# Patient Record
Sex: FEMALE | Race: ASIAN | NOT HISPANIC OR LATINO | Employment: FULL TIME | URBAN - METROPOLITAN AREA
[De-identification: names, ages, dates, MRNs, and addresses within clinical notes are randomized per-mention and may not be internally consistent; named-entity substitution may affect disease eponyms.]

---

## 2021-09-17 ENCOUNTER — TELEPHONE (OUTPATIENT)
Dept: ONCOLOGY | Facility: CLINIC | Age: 51
End: 2021-09-17

## 2021-09-17 ENCOUNTER — TELEPHONE (OUTPATIENT)
Dept: SOCIAL WORK | Facility: HOSPITAL | Age: 51
End: 2021-09-17

## 2021-09-17 NOTE — TELEPHONE ENCOUNTER
Caller: Katia Hatfield    Relationship to patient: SELF     Best call back number: [phone]    Patient is needing: TRANSPORTATION FOR 9- APPT.

## 2021-09-17 NOTE — TELEPHONE ENCOUNTER
SW called pt to provide assistance with transportation needs.  LVM requesting a call back.  SW available for ongoing support and resource needs.

## 2021-09-23 ENCOUNTER — TELEPHONE (OUTPATIENT)
Dept: ONCOLOGY | Facility: CLINIC | Age: 51
End: 2021-09-23

## 2021-09-23 NOTE — TELEPHONE ENCOUNTER
Caller: Katia Hatfield    Relationship to patient: Self    Best call back number: [phone]    Chief complaint: PT WILL NEED [facility]  FOR NEW PT APPT ON 9/30/21    Type of visit: NEW PT    Requested date: 9/30/21    If rescheduling, when is the original appointment: NA    Additional notes:PT OK WITH PHONE . JUST NOTIFYING PRACTICE.

## 2021-09-27 ENCOUNTER — TELEPHONE (OUTPATIENT)
Dept: ONCOLOGY | Facility: CLINIC | Age: 51
End: 2021-09-27

## 2021-09-27 NOTE — TELEPHONE ENCOUNTER
Caller: Katia Hatfield    Relationship to patient: Self    Best call back number: [phone]    Chief complaint:  PT NEEDS TO RESCHEDULE    Type of visit: NEW PT    Requested date: 10/07    If rescheduling, when is the original appointment: 09/30    Additional notes: PT NEEDS

## 2021-09-28 NOTE — TELEPHONE ENCOUNTER
Called patient back to inform her Dr. Gonzalez doesn't have anything on 10/7/21. She said she will make the appointment on 9/30/21.

## 2021-09-30 ENCOUNTER — CONSULT (OUTPATIENT)
Dept: ONCOLOGY | Facility: CLINIC | Age: 51
End: 2021-09-30

## 2021-09-30 ENCOUNTER — LAB (OUTPATIENT)
Dept: LAB | Facility: HOSPITAL | Age: 51
End: 2021-09-30

## 2021-09-30 VITALS
TEMPERATURE: 97.8 F | OXYGEN SATURATION: 97 % | DIASTOLIC BLOOD PRESSURE: 77 MMHG | HEART RATE: 83 BPM | WEIGHT: 135.5 LBS | RESPIRATION RATE: 16 BRPM | SYSTOLIC BLOOD PRESSURE: 118 MMHG | HEIGHT: 63 IN | BODY MASS INDEX: 24.01 KG/M2

## 2021-09-30 DIAGNOSIS — Z12.31 ENCOUNTER FOR SCREENING MAMMOGRAM FOR MALIGNANT NEOPLASM OF BREAST: Primary | ICD-10-CM

## 2021-09-30 LAB
ALBUMIN SERPL-MCNC: 4.4 G/DL (ref 3.5–5.2)
ALBUMIN/GLOB SERPL: 1.6 G/DL
ALP SERPL-CCNC: 47 U/L (ref 39–117)
ALT SERPL W P-5'-P-CCNC: 17 U/L (ref 1–33)
ANION GAP SERPL CALCULATED.3IONS-SCNC: 11 MMOL/L (ref 5–15)
AST SERPL-CCNC: 23 U/L (ref 1–32)
BILIRUB SERPL-MCNC: 0.2 MG/DL (ref 0–1.2)
BUN SERPL-MCNC: 20 MG/DL (ref 6–20)
BUN/CREAT SERPL: 24.7 (ref 7–25)
CALCIUM SPEC-SCNC: 9.6 MG/DL (ref 8.6–10.5)
CHLORIDE SERPL-SCNC: 103 MMOL/L (ref 98–107)
CO2 SERPL-SCNC: 27 MMOL/L (ref 22–29)
CREAT SERPL-MCNC: 0.81 MG/DL (ref 0.57–1)
ERYTHROCYTE [DISTWIDTH] IN BLOOD BY AUTOMATED COUNT: 13 % (ref 12.3–15.4)
GFR SERPL CREATININE-BSD FRML MDRD: 75 ML/MIN/1.73
GFR SERPL CREATININE-BSD FRML MDRD: 90 ML/MIN/1.73
GLOBULIN UR ELPH-MCNC: 2.8 GM/DL
GLUCOSE SERPL-MCNC: 89 MG/DL (ref 65–99)
HCT VFR BLD AUTO: 38.9 % (ref 34–46.6)
HGB BLD-MCNC: 12.6 G/DL (ref 12–15.9)
LYMPHOCYTES # BLD AUTO: 2 10*3/MM3 (ref 0.7–3.1)
LYMPHOCYTES NFR BLD AUTO: 34.1 % (ref 19.6–45.3)
MCH RBC QN AUTO: 28.2 PG (ref 26.6–33)
MCHC RBC AUTO-ENTMCNC: 32.3 G/DL (ref 31.5–35.7)
MCV RBC AUTO: 87.3 FL (ref 79–97)
MONOCYTES # BLD AUTO: 0.3 10*3/MM3 (ref 0.1–0.9)
MONOCYTES NFR BLD AUTO: 4.6 % (ref 5–12)
NEUTROPHILS NFR BLD AUTO: 3.6 10*3/MM3 (ref 1.7–7)
NEUTROPHILS NFR BLD AUTO: 61.3 % (ref 42.7–76)
PLATELET # BLD AUTO: 183 10*3/MM3 (ref 140–450)
PMV BLD AUTO: 8.6 FL (ref 6–12)
POTASSIUM SERPL-SCNC: 4.3 MMOL/L (ref 3.5–5.2)
PROT SERPL-MCNC: 7.2 G/DL (ref 6–8.5)
RBC # BLD AUTO: 4.46 10*6/MM3 (ref 3.77–5.28)
SODIUM SERPL-SCNC: 141 MMOL/L (ref 136–145)
WBC # BLD AUTO: 5.9 10*3/MM3 (ref 3.4–10.8)

## 2021-09-30 PROCEDURE — 36415 COLL VENOUS BLD VENIPUNCTURE: CPT | Performed by: INTERNAL MEDICINE

## 2021-09-30 PROCEDURE — 80053 COMPREHEN METABOLIC PANEL: CPT | Performed by: INTERNAL MEDICINE

## 2021-09-30 PROCEDURE — 99205 OFFICE O/P NEW HI 60 MIN: CPT | Performed by: INTERNAL MEDICINE

## 2021-09-30 PROCEDURE — 85025 COMPLETE CBC W/AUTO DIFF WBC: CPT | Performed by: INTERNAL MEDICINE

## 2021-09-30 RX ORDER — CHLORAL HYDRATE 500 MG
CAPSULE ORAL
COMMUNITY

## 2021-09-30 RX ORDER — LOSARTAN POTASSIUM 50 MG/1
50 TABLET ORAL DAILY
COMMUNITY
End: 2022-04-20 | Stop reason: SDUPTHER

## 2021-09-30 RX ORDER — TAMOXIFEN CITRATE 20 MG/1
TABLET ORAL DAILY
COMMUNITY
End: 2021-09-30 | Stop reason: SDUPTHER

## 2021-09-30 RX ORDER — PANTOPRAZOLE SODIUM 20 MG/1
20 TABLET, DELAYED RELEASE ORAL DAILY
COMMUNITY
End: 2022-03-31

## 2021-09-30 RX ORDER — TAMOXIFEN CITRATE 20 MG/1
20 TABLET ORAL DAILY
Qty: 90 TABLET | Refills: 3 | Status: SHIPPED | OUTPATIENT
Start: 2021-09-30 | End: 2021-12-09 | Stop reason: SDUPTHER

## 2021-09-30 RX ORDER — TRAZODONE HYDROCHLORIDE 50 MG/1
50 TABLET ORAL NIGHTLY
COMMUNITY
End: 2022-08-29

## 2021-09-30 NOTE — PROGRESS NOTES
"  Subjective     PROBLEM LIST:  1. [id number] ER+ CO+ Her2+ (neg by FISH)  A) right mastectomy 7/2017 showed a 2.6 cm high grade IDC. 1/2 SLN involved.  MRI 7/2017 showed enhancing mass in the right hepatic lobe 1.8 cm.  Liver biopsy showed liver cirrhosis without evidence of malignancy.   Follow up imaging of liver stable through 12/2020.  B) adjuvant treatment with TCH x 6 cycles, followed by herceptin.  Tamoxifen started 1/2018.  2. hypertension    CHIEF COMPLAINT: breast cancer      HISTORY OF PRESENT ILLNESS:  The patient is a 50 y.o. female, referred for a history of triple positive breast cancer, currently on tamoxifen.  She has recently moved here from [place] and is here to establish care.    She says she does ok with tamoxifen - she does have some difficulty sleeping and takes trazodone for this.  She has hot flashes but she does not feel like she needs to take anything for this.    She has had about 2 months of upset stomach and occasional nausea in the morning.  It sometimes bothers her at night.  She thinks this is gotten better recently.    She previously had discussed 10 years of tamoxifen with her oncologist.    dexa scan 12/2020 - normal    REVIEW OF SYSTEMS:  A 14 point review of systems was performed and is negative except as noted above.    Past Medical History:   Diagnosis Date   • Gall stones    • Hypertension              Objective     /77   Pulse 83   Temp 97.8 °F (36.6 °C) (Temporal)   Resp 16   Ht 160 cm (62.99\")   Wt 61.5 kg (135 lb 8 oz)   SpO2 97%   BMI 24.01 kg/m²   Performance Status:  ECOG score: 0           General: well appearing female in no acute distress  Neuro: alert and oriented  HEENT: sclerae anicteric, oropharynx clear  Lymphatics: no cervical, supraclavicular, or axillary adenopathy  Cardiovascular: regular rate and rhythm, no murmurs  Lungs: clear to auscultation bilaterally  Abdomen: soft, nontender, nondistended.  No palpable organomegaly  Extremities: no " lower extremity edema  Skin: no rashes, lesions, bruising, or petechiae  Psych: mood and affect appropriate    No results found for: WBC, HGB, HCT, MCV, PLT  No results found for: GLUCOSE, BUN, CREATININE, EGFRIFNONA, EGFRIFAFRI, BCR, K, CO2, CALCIUM, PROTENTOTREF, ALBUMIN, LABIL2, BILIRUBIN, AST, ALT    No image results found.            Assessment/Plan     Katia Hatfield is a 50 y.o. female 2ith a history of T2N1M0 ER+ Her2+ IDC of the right breast, here to establish care, on tamoxifen.    Plan to continue tamoxifen for a minimum of 5 years, which will be 1/2022.  Discussed that there is an option for extended endocrine therapy.  She feels comfortable with continuing tamoxifen for 10 years.    I will order a left breast screening mammogram to be done in December.    She would be due for repeat bone density scan in December 2022.    She had about 3 years of follow-up imaging of her liver after her liver biopsy.  There are no concerning lesions and I do not think she needs further follow-up imaging at this time.    Follow-up in 6 months.           A total greater than 60 mins minutes was spent in face to face patient time, examination, counseling, charting, reviewing test results, and reviewing outside records.    Winter Gonzalez MD    9/30/2021

## 2021-11-04 ENCOUNTER — TELEPHONE (OUTPATIENT)
Dept: ONCOLOGY | Facility: CLINIC | Age: 51
End: 2021-11-04

## 2021-11-04 NOTE — TELEPHONE ENCOUNTER
Caller: ISABELL    Relationship: DR [facility]    Best call back number: [phone]    What is the best time to reach you: ANYTIME    Who are you requesting to speak with (clinical staff, provider,  specific staff member): DR SALAZAR OR NURSE    What was the call regarding: PT WAS SUPPOSED TO HAVE HAD A TRANSVAGINAL ULTRASOUND IN July WHEN LIVING IN [place]. SHE RECENTLY MOVED TO [place] AND NEEDS TO GET THIS SCHEDULED. ISABELL WOULD LIKE TO KNOW IF THEY SHOULD SCHEDULE THIS OR IF DR SALAZAR WILL BE SCHEDULING IT.     PLS CALL ISABELL BACK TO ADVISE.

## 2021-11-08 ENCOUNTER — TRANSCRIBE ORDERS (OUTPATIENT)
Dept: ADMINISTRATIVE | Facility: HOSPITAL | Age: 51
End: 2021-11-08

## 2021-11-08 DIAGNOSIS — Z79.810 LONG TERM CURR USE OF SELECTIVE ESTROGEN RECEPTOR MODULATORS (SERMS): Primary | ICD-10-CM

## 2021-11-18 ENCOUNTER — HOSPITAL ENCOUNTER (OUTPATIENT)
Dept: ULTRASOUND IMAGING | Facility: HOSPITAL | Age: 51
Discharge: HOME | End: 2021-11-18
Admitting: FAMILY MEDICINE

## 2021-11-18 DIAGNOSIS — Z79.810 LONG TERM CURR USE OF SELECTIVE ESTROGEN RECEPTOR MODULATORS (SERMS): ICD-10-CM

## 2021-11-18 PROCEDURE — 76830 TRANSVAGINAL US NON-OB: CPT

## 2021-12-09 DIAGNOSIS — C50.412 MALIGNANT NEOPLASM OF UPPER-OUTER QUADRANT OF LEFT FEMALE BREAST, UNSPECIFIED ESTROGEN RECEPTOR STATUS (HCC): Primary | ICD-10-CM

## 2021-12-09 RX ORDER — TAMOXIFEN CITRATE 20 MG/1
20 TABLET ORAL DAILY
Qty: 90 TABLET | Refills: 3 | Status: SHIPPED | OUTPATIENT
Start: 2021-12-09

## 2021-12-09 NOTE — TELEPHONE ENCOUNTER
Caller: Katia Hatfield    Relationship: Self    Best call back number: [phone]    Requested Prescriptions:   Requested Prescriptions     Pending Prescriptions Disp Refills   • tamoxifen (NOLVADEX) 20 MG chemo tablet 90 tablet 3     Sig: Take 1 tablet by mouth Daily.        Pharmacy where request should be sent: [id number] - [place], [place] - [address] - [phone]  - [phone] FX     Additional details provided by patient: PATIENT HAS ONE TABLET LEFT. PATIENT WOULD LIKE A 3 DAY SUPPLY    Does the patient have less than a 3 day supply:  [x] Yes  [] No

## 2021-12-13 ENCOUNTER — APPOINTMENT (OUTPATIENT)
Dept: OTHER | Facility: HOSPITAL | Age: 51
End: 2021-12-13

## 2021-12-13 ENCOUNTER — HOSPITAL ENCOUNTER (OUTPATIENT)
Dept: MAMMOGRAPHY | Facility: HOSPITAL | Age: 51
Discharge: HOME | End: 2021-12-13
Admitting: INTERNAL MEDICINE

## 2021-12-13 DIAGNOSIS — Z12.31 ENCOUNTER FOR SCREENING MAMMOGRAM FOR MALIGNANT NEOPLASM OF BREAST: ICD-10-CM

## 2021-12-13 PROCEDURE — 77063 BREAST TOMOSYNTHESIS BI: CPT | Performed by: RADIOLOGY

## 2021-12-13 PROCEDURE — 77067 SCR MAMMO BI INCL CAD: CPT

## 2021-12-13 PROCEDURE — 77067 SCR MAMMO BI INCL CAD: CPT | Performed by: RADIOLOGY

## 2021-12-13 PROCEDURE — 77063 BREAST TOMOSYNTHESIS BI: CPT

## 2022-03-24 DIAGNOSIS — C50.412 MALIGNANT NEOPLASM OF UPPER-OUTER QUADRANT OF LEFT FEMALE BREAST, UNSPECIFIED ESTROGEN RECEPTOR STATUS: ICD-10-CM

## 2022-03-24 RX ORDER — TAMOXIFEN CITRATE 20 MG/1
20 TABLET ORAL DAILY
Qty: 90 TABLET | Refills: 3 | OUTPATIENT
Start: 2022-03-24

## 2022-03-24 NOTE — TELEPHONE ENCOUNTER
Caller: Katia Hatfield    Relationship: Self    Best call back number: [phone]    Requested Prescriptions:   Requested Prescriptions     Pending Prescriptions Disp Refills   • tamoxifen (NOLVADEX) 20 MG chemo tablet 90 tablet 3     Sig: Take 1 tablet by mouth Daily.        Pharmacy where request should be sent: [id number] - [place], [place] - [address] - [phone]  - [phone] FX     Additional details provided by patieNT: PATIENT HAS 2DAY SUPPLY    Does the patient have less than a 3 day supply:  [x] Yes  [] No    Kameron Pérez Rep   03/24/22 09:59 EDT

## 2022-03-31 ENCOUNTER — OFFICE VISIT (OUTPATIENT)
Dept: ONCOLOGY | Facility: CLINIC | Age: 52
End: 2022-03-31

## 2022-03-31 ENCOUNTER — LAB (OUTPATIENT)
Dept: LAB | Facility: HOSPITAL | Age: 52
End: 2022-03-31

## 2022-03-31 VITALS
HEART RATE: 83 BPM | OXYGEN SATURATION: 97 % | SYSTOLIC BLOOD PRESSURE: 140 MMHG | WEIGHT: 134 LBS | DIASTOLIC BLOOD PRESSURE: 75 MMHG | HEIGHT: 63 IN | TEMPERATURE: 97.1 F | RESPIRATION RATE: 16 BRPM | BODY MASS INDEX: 23.74 KG/M2

## 2022-03-31 DIAGNOSIS — R53.82 CHRONIC FATIGUE: Primary | ICD-10-CM

## 2022-03-31 DIAGNOSIS — F41.9 ANXIETY: ICD-10-CM

## 2022-03-31 DIAGNOSIS — R07.9 CHEST PAIN, UNSPECIFIED TYPE: ICD-10-CM

## 2022-03-31 DIAGNOSIS — K21.9 GASTROESOPHAGEAL REFLUX DISEASE WITHOUT ESOPHAGITIS: ICD-10-CM

## 2022-03-31 DIAGNOSIS — C50.412 MALIGNANT NEOPLASM OF UPPER-OUTER QUADRANT OF LEFT FEMALE BREAST, UNSPECIFIED ESTROGEN RECEPTOR STATUS: ICD-10-CM

## 2022-03-31 DIAGNOSIS — R06.00 DYSPNEA, UNSPECIFIED TYPE: ICD-10-CM

## 2022-03-31 DIAGNOSIS — Z12.31 ENCOUNTER FOR SCREENING MAMMOGRAM FOR MALIGNANT NEOPLASM OF BREAST: ICD-10-CM

## 2022-03-31 LAB
ALBUMIN SERPL-MCNC: 4.3 G/DL (ref 3.5–5.2)
ALBUMIN/GLOB SERPL: 1.6 G/DL
ALP SERPL-CCNC: 47 U/L (ref 39–117)
ALT SERPL W P-5'-P-CCNC: 16 U/L (ref 1–33)
ANION GAP SERPL CALCULATED.3IONS-SCNC: 11 MMOL/L (ref 5–15)
AST SERPL-CCNC: 18 U/L (ref 1–32)
BASOPHILS # BLD AUTO: 0.01 10*3/MM3 (ref 0–0.2)
BASOPHILS NFR BLD AUTO: 0.2 % (ref 0–1.5)
BILIRUB SERPL-MCNC: 0.3 MG/DL (ref 0–1.2)
BUN SERPL-MCNC: 15 MG/DL (ref 6–20)
BUN/CREAT SERPL: 18.3 (ref 7–25)
CALCIUM SPEC-SCNC: 9.8 MG/DL (ref 8.6–10.5)
CHLORIDE SERPL-SCNC: 104 MMOL/L (ref 98–107)
CO2 SERPL-SCNC: 27 MMOL/L (ref 22–29)
CREAT SERPL-MCNC: 0.82 MG/DL (ref 0.57–1)
DEPRECATED RDW RBC AUTO: 42.8 FL (ref 37–54)
EGFRCR SERPLBLD CKD-EPI 2021: 86.2 ML/MIN/1.73
EOSINOPHIL # BLD AUTO: 0.1 10*3/MM3 (ref 0–0.4)
EOSINOPHIL NFR BLD AUTO: 1.7 % (ref 0.3–6.2)
ERYTHROCYTE [DISTWIDTH] IN BLOOD BY AUTOMATED COUNT: 12.7 % (ref 12.3–15.4)
GLOBULIN UR ELPH-MCNC: 2.7 GM/DL
GLUCOSE SERPL-MCNC: 107 MG/DL (ref 65–99)
HCT VFR BLD AUTO: 39.8 % (ref 34–46.6)
HGB BLD-MCNC: 12.7 G/DL (ref 12–15.9)
IMM GRANULOCYTES # BLD AUTO: 0.03 10*3/MM3 (ref 0–0.05)
IMM GRANULOCYTES NFR BLD AUTO: 0.5 % (ref 0–0.5)
LYMPHOCYTES # BLD AUTO: 1.68 10*3/MM3 (ref 0.7–3.1)
LYMPHOCYTES NFR BLD AUTO: 28 % (ref 19.6–45.3)
MCH RBC QN AUTO: 29.4 PG (ref 26.6–33)
MCHC RBC AUTO-ENTMCNC: 31.9 G/DL (ref 31.5–35.7)
MCV RBC AUTO: 92.1 FL (ref 79–97)
MONOCYTES # BLD AUTO: 0.4 10*3/MM3 (ref 0.1–0.9)
MONOCYTES NFR BLD AUTO: 6.7 % (ref 5–12)
NEUTROPHILS NFR BLD AUTO: 3.79 10*3/MM3 (ref 1.7–7)
NEUTROPHILS NFR BLD AUTO: 62.9 % (ref 42.7–76)
NRBC BLD AUTO-RTO: 0 /100 WBC (ref 0–0.2)
PLATELET # BLD AUTO: 223 10*3/MM3 (ref 140–450)
PMV BLD AUTO: 11 FL (ref 6–12)
POTASSIUM SERPL-SCNC: 4.1 MMOL/L (ref 3.5–5.2)
PROT SERPL-MCNC: 7 G/DL (ref 6–8.5)
RBC # BLD AUTO: 4.32 10*6/MM3 (ref 3.77–5.28)
SODIUM SERPL-SCNC: 142 MMOL/L (ref 136–145)
WBC NRBC COR # BLD: 6.01 10*3/MM3 (ref 3.4–10.8)

## 2022-03-31 PROCEDURE — 85025 COMPLETE CBC W/AUTO DIFF WBC: CPT

## 2022-03-31 PROCEDURE — 80053 COMPREHEN METABOLIC PANEL: CPT

## 2022-03-31 PROCEDURE — 36415 COLL VENOUS BLD VENIPUNCTURE: CPT

## 2022-03-31 PROCEDURE — 99215 OFFICE O/P EST HI 40 MIN: CPT | Performed by: NURSE PRACTITIONER

## 2022-03-31 RX ORDER — OMEPRAZOLE 40 MG/1
40 CAPSULE, DELAYED RELEASE ORAL DAILY
Qty: 30 CAPSULE | Refills: 5 | Status: SHIPPED | OUTPATIENT
Start: 2022-03-31

## 2022-03-31 RX ORDER — TAMOXIFEN CITRATE 20 MG/1
TABLET ORAL
COMMUNITY

## 2022-03-31 RX ORDER — PANTOPRAZOLE SODIUM 40 MG/1
TABLET, DELAYED RELEASE ORAL
COMMUNITY
End: 2022-03-31

## 2022-03-31 RX ORDER — ZOLPIDEM TARTRATE 10 MG/1
TABLET ORAL
COMMUNITY
Start: 2021-11-01 | End: 2022-04-18 | Stop reason: DRUGHIGH

## 2022-03-31 RX ORDER — LOSARTAN POTASSIUM 50 MG/1
TABLET ORAL
COMMUNITY

## 2022-03-31 RX ORDER — FLUTICASONE PROPIONATE 50 MCG
SPRAY, SUSPENSION (ML) NASAL
COMMUNITY

## 2022-03-31 RX ORDER — ZOLPIDEM TARTRATE 5 MG/1
TABLET ORAL
COMMUNITY
End: 2022-04-18 | Stop reason: DRUGHIGH

## 2022-03-31 NOTE — PROGRESS NOTES
"      PROBLEM LIST:  1. [id number] ER+ AL+ Her2+ (neg by FISH)  A) right mastectomy 7/2017 showed a 2.6 cm high grade IDC. 1/2 SLN involved.  MRI 7/2017 showed enhancing mass in the right hepatic lobe 1.8 cm.  Liver biopsy showed liver cirrhosis without evidence of malignancy.   Follow up imaging of liver stable through 12/2020.  B) adjuvant treatment with TCH x 6 cycles, followed by herceptin.  Tamoxifen started 1/2018.  2. hypertension       Subjective     CHIEF COMPLAINT: breast cancer    HISTORY OF PRESENT ILLNESS:   Katia Hatfield returns for follow-up.   She continues on tamoxifen.  She has noticed increased fatigue over the past 2 months.  She wakes up feeling tired like she has not slept well.  She has heartburn and is on Protonix but does not feel like this is controlling her symptoms.  She complains of midsternal chest pain that last approximately 2 minutes that occurs once or twice a week over the past few months. She denies any chest pain with inspiration.  The chest pain does not radiate into her jaw or down her left arm.  She does feel occasional shortness of air but her oxygen saturation level is normal when she checks it.  She has frequent bouts of anxiety related to work.  She has had bilateral lower extremity swelling over the past few weeks.      Patient was accompanied today by an .    Objective      /75   Pulse 83   Temp 97.1 °F (36.2 °C) (Temporal)   Resp 16   Ht 160 cm (62.99\")   Wt 60.8 kg (134 lb)   SpO2 97%   BMI 23.74 kg/m²    Vitals:    03/31/22 1000   PainSc:   5               ECOG score: 0             General: well appearing female in no acute distress  Neuro: alert and oriented  HEENT: sclera anicteric, oropharynx clear  Lymphatics: no cervical, supraclavicular, or axillary adenopathy  Cardiovascular: regular rate and rhythm, no murmurs  Lungs: clear to auscultation bilaterally  Breast: Right mastectomy incision well-healed with no masses or skin changes.  Left breast " with no masses, skin changes or discharge  Abdomen: soft, nontender, nondistended.  No palpable organomegaly  Extremeties: no lower extremity edema  Skin: no rashes, lesions, bruising, or petechiae  Psych: mood and affect appropriate    I have reexamined the patient and the results are consistent with the previously documented exam. Jailyn Kat, JOSE ALBERTO        RECENT LABS:  Lab Results   Component Value Date    WBC 6.01 03/31/2022    HGB 12.7 03/31/2022    HCT 39.8 03/31/2022    MCV 92.1 03/31/2022     03/31/2022       Lab Results   Component Value Date    GLUCOSE 107 (H) 03/31/2022    BUN 15 03/31/2022    CREATININE 0.82 03/31/2022    EGFRIFNONA 75 09/30/2021    EGFRIFAFRI 90 09/30/2021    BCR 18.3 03/31/2022    K 4.1 03/31/2022    CO2 27.0 03/31/2022    CALCIUM 9.8 03/31/2022    ALBUMIN 4.30 03/31/2022    AST 18 03/31/2022    ALT 16 03/31/2022       Mammo Screening Modified With Tomosynthesis Left With CAD  Narrative: DIGITAL SCREENING MAMMOGRAM WITH TOMOSYNTHESIS     HISTORY: Routine screening.  The patient is status post a previous right  breast mastectomy as well as a left breast excisional biopsy.     IMAGE COMPARISON:  12/9/2020, 2/25/2020, 2/14/2018.     TECHNIQUE: Low dose full field digital breast tomosynthesis imaging was  performed with 2D and 3D acquisitions consisting of left CC and MLO  views. In addition, an exaggerated lateral left CC view was performed.     FINDINGS: The left breast is heterogeneously dense, which may obscure  small masses. The fibroglandular pattern is stable. There are stable  excisional biopsy changes in the posterior 12:00 distribution. There is  no mass, worrisome microcalcifications, or architectural distortion to  suggest development of malignancy.     Impression: Benign screening mammogram.  No findings suspicious for  malignancy.        ACR BI-RADS CATEGORY:  2, BENIGN     RECOMMENDATION: Yearly mammogram, yearly clinical breast exam, and  encourage self breast  awareness.     CAD was used.     The standard false negative rate of mammography is between 10% and 25%.   Complex patterns or increased breast density will markedly elevate the  false negative rate of mammography.     A letter, in lay terminology, with the results of this exam will be  mailed to the patient.       At our facility, a triangular marker is positioned over a palpable area  of concern indicated by the patient. A [place] marker is placed over a  visible skin lesion. A linear marker indicates a scar.     If there is a palpable area of concern, biopsy should be considered  regardless of imaging findings.           This report was finalized on 12/28/2021 11:31 AM by Dr. Celia Pedroza MD.     MAMMO Outside Films  This procedure was auto-finalized with no dictation required.  MAMMO Outside Films  This procedure was auto-finalized with no dictation required.              Assessment/Plan   Katia Hatfield is a 51 y.o. female with a history of T2N1M0 ER+ Her2+ IDC of the right breast, here to establish care, on tamoxifen.     She continues on tamoxifen.  Plan is to continue tamoxifen for a minimum of 5 years which would be January 2023.  There is an option for extended endocrine therapy.  She feels comfortable continuing tamoxifen for 10 years.    Left breast screening mammogram completed 12/13/2021 BI-RADS Category 2 benign.  She will be due for an annual screening left mammogram December 2022.     She would be due for repeat bone density scan in December 2022.     She had about 3 years of follow-up imaging of her liver after her liver biopsy.  There are no concerning lesions and I do not think she needs further follow-up imaging at this time.    Fatigue with daytime sleepiness: Referral made for sleep study.    Anxiety: Referral placed for behavioral health appointment with JOSE ALBERTO Mares.    Chest pain: She does have a history of receiving adjuvant treatment with TCH x 6 cycles, followed by herceptin.  I will  place an order for an echocardiogram and a referral to cardiology.    GERD: Protonix is no longer controlling her symptoms.  Protonix discontinued prescription for Prilosec 40 mg once daily sent to pharmacy today.     Follow-up in 6 months.                  I spent 62 minutes caring for Katia on this date of service. This time includes time spent by me in the following activities: preparing for the visit, obtaining and/or reviewing a separately obtained history, performing a medically appropriate examination and/or evaluation, counseling and educating the patient/family/caregiver, ordering medications, tests, or procedures, referring and communicating with other health care professionals and documenting information in the medical record        Jailyn Kat, JOSE ALBERTO  [facility]    3/31/2022          CC:

## 2022-04-04 ENCOUNTER — TELEPHONE (OUTPATIENT)
Dept: ONCOLOGY | Facility: CLINIC | Age: 52
End: 2022-04-04

## 2022-04-04 NOTE — TELEPHONE ENCOUNTER
Spoke with patient giving her the appointment details and gave her the phone number for billing as well for her to see how much the appointment would cost.

## 2022-04-04 NOTE — TELEPHONE ENCOUNTER
Caller: Katia Hatfield    Relationship: Self    Best call back number: [phone]      What was the call regarding: PATIENT CALLED SHE FORGOT THE INFORMATION ON THE PSYCH DR THAT DR SALAZAR WANTED HER TO SEE    Do you require a callback: YES

## 2022-04-18 ENCOUNTER — OFFICE VISIT (OUTPATIENT)
Dept: PSYCHIATRY | Facility: CLINIC | Age: 52
End: 2022-04-18

## 2022-04-18 DIAGNOSIS — F41.1 GENERALIZED ANXIETY DISORDER: ICD-10-CM

## 2022-04-18 DIAGNOSIS — G47.9 SLEEP DISTURBANCE: Primary | ICD-10-CM

## 2022-04-18 DIAGNOSIS — N95.1 MENOPAUSAL SYMPTOMS: ICD-10-CM

## 2022-04-18 PROCEDURE — 90792 PSYCH DIAG EVAL W/MED SRVCS: CPT | Performed by: NURSE PRACTITIONER

## 2022-04-18 RX ORDER — VENLAFAXINE HYDROCHLORIDE 37.5 MG/1
37.5 CAPSULE, EXTENDED RELEASE ORAL DAILY
Qty: 30 CAPSULE | Refills: 1 | Status: SHIPPED | OUTPATIENT
Start: 2022-04-18 | End: 2022-07-18 | Stop reason: SINTOL

## 2022-04-18 RX ORDER — ZOLPIDEM TARTRATE 10 MG/1
10 TABLET ORAL NIGHTLY PRN
Qty: 30 TABLET | Refills: 2 | Status: SHIPPED | OUTPATIENT
Start: 2022-04-18

## 2022-04-18 NOTE — PROGRESS NOTES
"  Subjective   Katia Hatfield is a  51 y.o. female who is here today for initial appointment in person face to face with her  and her daughter who is town visiting and is a PA-C. Patient speaks mandarin and some english. Patient was referred by: [facility] to evaluate patient's challenges of anxiety. Patient and her  moved to [place] from [place] about a year ago. She was diagnosed with breast cancer in 2017 and is s/p mastectomy. She is on Tamoxifen daily for adjuvant treatment.       Chief Complaint:  Anxiety, poor sleep       History of Present Illness The patient reports the following symptoms of generalized anxiety: constant anxiety/worry, restlessness/on edge, difficulty concentrating, mind goes blank, muscle tension and sleep disturbance. The symptoms have been present for at least 12+ month(s) and have caused impairment in important areas of functioning. She reports worrying over her , her work, her daughters and fear of recurrence of cancer. Patient has difficulty with falling asleep and staying asleep awakening through the night and can't always fall back to sleep. She works from home and they only have one car so she is \"standed at home without transportation typically\". She states prior to moving she had worries but symptoms have increased. 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 She reports appetite as good. Relationships are good with  and daughters. Her one daughter still lives in [place] and daughter today lives in [place]. Patient reports she used to do [place] but has no transportation and her work hours are on [place] time so works noon to 9 pm 5 days a week. Her \"lunch break at 4 p she is cooking for she and her 's dinner. Weekends her  will work on Saturdays for overtime and she is without a car. She denies panic, depression, PTSD or OCD. She denies SI/HI " or AVH or robin. Denies alcohol or tobacco use or illicit drug use. Patient reports significant symptoms of menopause: hot flashes, sleep disturbance, mood changes, and last period Oct 2020.     The following portions of the patient's history were reviewed and updated as appropriate: allergies, current medications, past family history, past medical history, past social history, past surgical history and problem list.      Past Psych History: denies     Substance Abuse: denies all       ABUSE HX: denies   LEGAL HX: denies     JH REVIEWED: no red flags , is on zolpidem 5 mg at hs filled by her PCP      Family Psychiatric History:  family history includes Breast cancer in her paternal grandmother; Diabetes in her father; No Known Problems in her mother.      Social History:  Has an associates degree,  and has two adult daughters. 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 They live in their own house. She watches TV to relax after work. Enjoys talking with her daughters and family.       Medical/Surgical History:  Past Medical History:   Diagnosis Date   • Breast cancer (HCC)     right breast mastectomy   • Drug therapy    • Gall stones    • Hypertension      Past Surgical History:   Procedure Laterality Date   • BREAST EXCISIONAL BIOPSY      benign left mass   • CHOLECYSTECTOMY     • LIVER BIOPSY     • MASTECTOMY Right        Allergies   Allergen Reactions   • Iodine Rash     With itching       Current Medications:   Current Outpatient Medications   Medication Sig Dispense Refill   • zolpidem (AMBIEN) 10 MG tablet Take 1 tablet by mouth At Night As Needed for Sleep. 30 tablet 2   • CALCIUM-MAGNESIUM-VITAMIN D PO Take  by mouth.     • fluticasone (FLONASE) 50 MCG/ACT nasal spray fluticasone propionate 50 mcg/actuation nasal spray,suspension   Spray 1 spray every day by intranasal route as directed.     • losartan (COZAAR) 50 MG tablet Take 50 mg by mouth Daily.     •  losartan (COZAAR) 50 MG tablet losartan 50 mg tablet   Take 1 tablet every day by oral route.     • Multiple Vitamins-Minerals (ZINC PO) Take  by mouth.     • Omega-3 Fatty Acids (fish oil) 1000 MG capsule capsule Take  by mouth Daily With Breakfast.     • omeprazole (priLOSEC) 40 MG capsule Take 1 capsule by mouth Daily. 30 capsule 5   • Probiotic Product (PROBIOTIC DAILY PO) Take  by mouth.     • tamoxifen (NOLVADEX) 20 MG chemo tablet Take 1 tablet by mouth Daily. 90 tablet 3   • tamoxifen (NOLVADEX) 20 MG chemo tablet tamoxifen 20 mg tablet   Take 1 tablet every day by oral route.     • Thiamine HCl (VITAMIN B-1 PO) Take  by mouth.     • traZODone (DESYREL) 50 MG tablet Take 50 mg by mouth Every Night.     • venlafaxine XR (EFFEXOR-XR) 37.5 MG 24 hr capsule Take 1 capsule by mouth Daily. 30 capsule 1     No current facility-administered medications for this visit.       Lab Results:  Lab on 03/31/2022   Component Date Value Ref Range Status   • Glucose 03/31/2022 107 (A) 65 - 99 mg/dL Final   • BUN 03/31/2022 15  6 - 20 mg/dL Final   • Creatinine 03/31/2022 0.82  0.57 - 1.00 mg/dL Final   • Sodium 03/31/2022 142  136 - 145 mmol/L Final   • Potassium 03/31/2022 4.1  3.5 - 5.2 mmol/L Final   • Chloride 03/31/2022 104  98 - 107 mmol/L Final   • CO2 03/31/2022 27.0  22.0 - 29.0 mmol/L Final   • Calcium 03/31/2022 9.8  8.6 - 10.5 mg/dL Final   • Total Protein 03/31/2022 7.0  6.0 - 8.5 g/dL Final   • Albumin 03/31/2022 4.30  3.50 - 5.20 g/dL Final   • ALT (SGPT) 03/31/2022 16  1 - 33 U/L Final   • AST (SGOT) 03/31/2022 18  1 - 32 U/L Final   • Alkaline Phosphatase 03/31/2022 47  39 - 117 U/L Final   • Total Bilirubin 03/31/2022 0.3  0.0 - 1.2 mg/dL Final   • Globulin 03/31/2022 2.7  gm/dL Final    Calculated Result   • A/G Ratio 03/31/2022 1.6  g/dL Final   • BUN/Creatinine Ratio 03/31/2022 18.3  7.0 - 25.0 Final   • Anion Gap 03/31/2022 11.0  5.0 - 15.0 mmol/L Final   • eGFR 03/31/2022 86.2  >60.0 mL/min/1.73 Final     National Kidney Foundation and American Society of Nephrology (ASN) Task Force recommended calculation based on the Chronic Kidney Disease Epidemiology Collaboration (CKD-EPI) equation refit without adjustment for race.   • WBC 03/31/2022 6.01  3.40 - 10.80 10*3/mm3 Final   • RBC 03/31/2022 4.32  3.77 - 5.28 10*6/mm3 Final   • Hemoglobin 03/31/2022 12.7  12.0 - 15.9 g/dL Final   • Hematocrit 03/31/2022 39.8  34.0 - 46.6 % Final   • MCV 03/31/2022 92.1  79.0 - 97.0 fL Final   • MCH 03/31/2022 29.4  26.6 - 33.0 pg Final   • MCHC 03/31/2022 31.9  31.5 - 35.7 g/dL Final   • RDW 03/31/2022 12.7  12.3 - 15.4 % Final   • RDW-SD 03/31/2022 42.8  37.0 - 54.0 fl Final   • MPV 03/31/2022 11.0  6.0 - 12.0 fL Final   • Platelets 03/31/2022 223  140 - 450 10*3/mm3 Final   • Neutrophil % 03/31/2022 62.9  42.7 - 76.0 % Final   • Lymphocyte % 03/31/2022 28.0  19.6 - 45.3 % Final   • Monocyte % 03/31/2022 6.7  5.0 - 12.0 % Final   • Eosinophil % 03/31/2022 1.7  0.3 - 6.2 % Final   • Basophil % 03/31/2022 0.2  0.0 - 1.5 % Final   • Immature Grans % 03/31/2022 0.5  0.0 - 0.5 % Final   • Neutrophils, Absolute 03/31/2022 3.79  1.70 - 7.00 10*3/mm3 Final   • Lymphocytes, Absolute 03/31/2022 1.68  0.70 - 3.10 10*3/mm3 Final   • Monocytes, Absolute 03/31/2022 0.40  0.10 - 0.90 10*3/mm3 Final   • Eosinophils, Absolute 03/31/2022 0.10  0.00 - 0.40 10*3/mm3 Final   • Basophils, Absolute 03/31/2022 0.01  0.00 - 0.20 10*3/mm3 Final   • Immature Grans, Absolute 03/31/2022 0.03  0.00 - 0.05 10*3/mm3 Final   • nRBC 03/31/2022 0.0  0.0 - 0.2 /100 WBC Final         Review of Systems Constitutional: Negative for appetite change, chills, diaphoresis, fatigue, fever and unexpected weight change.   HENT: Negative for hearing loss, sore throat, trouble swallowing and voice change.    Eyes: Negative for photophobia and visual disturbance.   Respiratory: Negative for cough, chest tightness and shortness of breath.    Cardiovascular: Negative for chest  pain and palpitations.   Gastrointestinal: Negative for abdominal pain, constipation, nausea and vomiting.   Endocrine: Negative for cold intolerance and heat intolerance.   Genitourinary: Negative for dysuria and frequency.   Musculoskeletal: Negative for arthralgia, back pain, joint swelling and neck stiffness.   Skin: Negative for color change and wound.   Allergic/Immunologic: Negative for environmental allergies and immunocompromised state.   Neurological: Negative for dizziness, tremors, seizures, syncope, weakness, light-headedness and headaches.   Hematological: Negative for adenopathy. Does not bruise/bleed easily.    Objective   Physical Exam  There were no vitals taken for this visit.    LIZA-7:    Over the last two weeks, how often have you been bothered by the following problems?  Feeling nervous, anxious or on edge: Nearly every day  Not being able to stop or control worrying: Nearly every day  Worrying too much about different things: Nearly every day  Trouble Relaxing: Nearly every day  Being so restless that it is hard to sit still: More than half the days  Becoming easily annoyed or irritable: More than half the days  Feeling afraid as if something awful might happen: Not at all  LIZA 7 Total Score: 16  If you checked any problems, how difficult have these problems made it for you to do your work, take care of things at home, or get along with other people: Very difficult  0-4: Minimal anxiety  5-9: Mild anxiety  10-14: Moderate anxiety  15-21: Severe anxiety    PHQ-9:  PHQ-2/PHQ-9 Depression Screening 4/18/2022   Retired PHQ-9 Total Score -   Retired Total Score -   Little Interest or Pleasure in Doing Things 1-->several days   Feeling Down, Depressed or Hopeless 1-->several days   PHQ-9: Brief Depression Severity Measure Score 2      5-9: Minimal symptoms  10-14: Major depression mild  15-19: Major depression moderate  Greater then 20: Major depression severe      Mental Status Exam:   Appearance:  appropriate  Hygiene:   good  Cooperation:  Cooperative  Eye Contact:  Good  Psychomotor Behavior:  Appropriate  Mood:  anxious  Affect:  Appropriate  Hopelessness: Denies  Speech:  Normal  Thought Process:  Linear  Thought Content:  Normal  Suicidal:  None  Homicidal:  None  Hallucinations:  None  Delusion:  None  Memory:  Intact  Orientation:  Person, Place, Time and Situation  Reliability:  good  Insight:  Fair  Judgement:  Good  Impulse Control:  Good        Short-term goals: Patient will be compliant with clinic appointments.  Patient will be engaged in therapy, medication compliant with minimal side effects. Patient  will report decrease of symptoms and frequency.    Long-term goals: Patient will have minimal symptoms of mental health disorder with continued treatment. Patient will be compliant with treatment and appointments.       Problem list: LIZA, menopausal symptoms, sleep disturbance   Strengths: patient appears motivated for treatment          Assessment/Plan   Diagnoses and all orders for this visit:    1. Sleep disturbance (Primary)  -     zolpidem (AMBIEN) 10 MG tablet; Take 1 tablet by mouth At Night As Needed for Sleep.  Dispense: 30 tablet; Refill: 2    2. Generalized anxiety disorder    3. Menopausal symptoms    Other orders  -     venlafaxine XR (EFFEXOR-XR) 37.5 MG 24 hr capsule; Take 1 capsule by mouth Daily.  Dispense: 30 capsule; Refill: 1        A psychological evaluation was conducted in order to assess past and current level of functioning. Areas assessed included, but were not limited to: perception of social support, perception of ability to face and deal with challenges in life (positive functioning), anxiety symptoms, depressive symptoms, perspective on beliefs/belief system, coping skills for stress, intelligence level,  Therapeutic rapport was established. Interventions conducted today were geared towards incorporating medication management along with support for continued therapy.  Education was also provided as to the med management with this provider and what to expect in subsequent sessions.    Assisted patient in processing above session content; acknowledged and normalized patient’s thoughts, feelings, and concerns.  Applied  positive coping skills and behavior management in session.  Allowed patient to freely discuss issues without interruption or judgment. Provided safe, confidential environment to facilitate the development of positive therapeutic relationship and encourage open, honest communication. Assisted patient in identifying risk factors which would indicate the need for higher level of care including thoughts to harm self or others and/or self-harming behavior and encouraged patient to contact this office, call 911, or present to the nearest emergency room should any of these events occur. Discussed crisis intervention services and means to access.  Patient adamantly and convincingly denies current suicidal or homicidal ideation or perceptual disturbance.    Discussed diagnosis and recommendations for treatment:    PROVIDE: Cognitive Behavioral Therapy and Solution Focused Therapy to improve functioning, maintain stability, and avoid decompensation and the need for higher level of care.    Hot flashes:  Encouraged to wear natural fibers cotton and linens  Avoid alcohol  Avoid caffeinated drinks  Encouraged exercise  Encouraged hydration with cool fluids  Utilize ceiling fans or table fans as necessary    MEDICATION MANAGEMENT RECOMMENDATIONS: increase zolpidem to 10 mg nightly for sleepless ness  ADD venlafaxine XR 37.5 mg daily for anxiety and menopausal symptoms      We discussed risks, benefits,goals and side effects of the above medication and the patient was agreeable with the plan.Patient was educated on the importance of compliance with treatment and follow-up appointments.To call for questions or concerns and return early if necessary. Crisis plan reviewed including  going to the Emergency department.       Treatment Plan: stabilize mood,  patient will stay out of the hospital and be at optimal level of functioning, take all medication as prescribed. Patient verbalized  understanding and agreement to plan.      Return in about 3 weeks (around 5/9/2022). in person with .

## 2022-04-19 NOTE — PROGRESS NOTES
[facility] Cardiology   Consult  Katia Hatfield  1970    VISIT DATE:  04/20/22    PCP:   Raquel Reyes MD  [address] [place] [address]        CC:  Chest Pain and Shortness of Breath      Problem List:  1. Hypertension  2. Chest Pain  3.  Breast cancer: Estrogen receptors and HER2 positive  -Status post right mastectomy  -Treated with tamoxifen, Herceptin    4. Liver Cirrhosis without evidence of malignancy  5. Heartburn    Echo: April 2022  · Left ventricular ejection fraction appears to be 61 - 65%. Left ventricular systolic function is normal.  · Left ventricular diastolic function was normal.  · Estimated right ventricular systolic pressure from tricuspid regurgitation is normal (<35 mmHg). Calculated right ventricular systolic pressure from tricuspid regurgitation is 21 mmHg.  · Normal global longitudinal LV strain (GLS) = -21.9%.            History of Present Illness:  Katia Hatfield  Is a 51 y.o. female with pertinent cardiac history detailed above.  Patient has a history of breast cancer treated with Herceptin.  She has been endorsing chest pain intermittently over the last few months.  She describes it as left-sided with occasional radiation to the shoulder or back.  Also radiation of the left arm.  No associated dyspnea or diaphoresis.  Does not note that it is necessarily provoked with exertion.  EKG in office today normal.  Echocardiogram preliminarily reviewed showing normal LVEF.  History obtained via .  The patient does have hypertension treated with losartan, she believes her total cholesterol is less than 200.  She does take fish oil supplementation..  She had right-sided breast cancer but left side is been uninvolved.    There are no problems to display for this patient.      Allergies   Allergen Reactions   • Iodine Rash     With itching       Social History     Socioeconomic History   • Marital status:    Tobacco Use   • Smoking status: Never  Smoker   • Smokeless tobacco: Never Used   Substance and Sexual Activity   • Alcohol use: Never   • Drug use: Never       Family History   Problem Relation Age of Onset   • No Known Problems Mother    • Diabetes Father    • Breast cancer Paternal Grandmother    • Ovarian cancer Neg Hx        Current Medications:    Current Outpatient Medications:   •  CALCIUM-MAGNESIUM-VITAMIN D PO, Take  by mouth., Disp: , Rfl:   •  fluticasone (FLONASE) 50 MCG/ACT nasal spray, fluticasone propionate 50 mcg/actuation nasal spray,suspension  Spray 1 spray every day by intranasal route as directed., Disp: , Rfl:   •  losartan (COZAAR) 50 MG tablet, losartan 50 mg tablet  Take 1 tablet every day by oral route., Disp: , Rfl:   •  Multiple Vitamins-Minerals (ZINC PO), Take  by mouth., Disp: , Rfl:   •  Omega-3 Fatty Acids (fish oil) 1000 MG capsule capsule, Take  by mouth Daily With Breakfast., Disp: , Rfl:   •  omeprazole (priLOSEC) 40 MG capsule, Take 1 capsule by mouth Daily., Disp: 30 capsule, Rfl: 5  •  Probiotic Product (PROBIOTIC DAILY PO), Take  by mouth., Disp: , Rfl:   •  tamoxifen (NOLVADEX) 20 MG chemo tablet, Take 1 tablet by mouth Daily., Disp: 90 tablet, Rfl: 3  •  tamoxifen (NOLVADEX) 20 MG chemo tablet, tamoxifen 20 mg tablet  Take 1 tablet every day by oral route., Disp: , Rfl:   •  Thiamine HCl (VITAMIN B-1 PO), Take  by mouth., Disp: , Rfl:   •  traZODone (DESYREL) 50 MG tablet, Take 50 mg by mouth Every Night., Disp: , Rfl:   •  venlafaxine XR (EFFEXOR-XR) 37.5 MG 24 hr capsule, Take 1 capsule by mouth Daily., Disp: 30 capsule, Rfl: 1  •  zolpidem (AMBIEN) 10 MG tablet, Take 1 tablet by mouth At Night As Needed for Sleep., Disp: 30 tablet, Rfl: 2     Review of Systems   Cardiovascular: Positive for chest pain and leg swelling. Negative for dyspnea on exertion, irregular heartbeat, near-syncope and palpitations.   Respiratory: Negative for cough and shortness of breath.        Vitals:    04/20/22 1053   BP: 120/74  "  Pulse: 88   Resp: 16   SpO2: 94%   Weight: 61.7 kg (136 lb)   Height: 160 cm (63\")       Physical Exam  Constitutional:       Appearance: Normal appearance.   Cardiovascular:      Rate and Rhythm: Normal rate and regular rhythm.      Pulses: Normal pulses.      Heart sounds: Normal heart sounds.   Pulmonary:      Effort: Pulmonary effort is normal.      Breath sounds: Normal breath sounds.   Abdominal:      Palpations: Abdomen is soft.   Musculoskeletal:      Right lower leg: No edema.      Left lower leg: No edema.   Neurological:      General: No focal deficit present.      Mental Status: She is alert.         Diagnostic Data:    ECG 12 Lead    Date/Time: 4/20/2022 11:34 AM  Performed by: Julián Alston MD  Authorized by: Julián Alston MD   Comparison: not compared with previous ECG   Previous ECG: no previous ECG available  Rhythm: sinus rhythm  Rate: normal  BPM: 82  QRS axis: normal    Clinical impression: normal ECG          No results found for: CHLPL, TRIG, HDL, LDLDIRECT  Lab Results   Component Value Date    GLUCOSE 107 (H) 03/31/2022    BUN 15 03/31/2022    CREATININE 0.82 03/31/2022     03/31/2022    K 4.1 03/31/2022     03/31/2022    CO2 27.0 03/31/2022     No results found for: HGBA1C  Lab Results   Component Value Date    WBC 6.01 03/31/2022    HGB 12.7 03/31/2022    HCT 39.8 03/31/2022     03/31/2022       Assessment:   Diagnosis Plan   1. Chest pain, unspecified type  ECG 12 Lead    Stress Test With Myocardial Perfusion One Day    Lipid Panel       Plan:      1.  Breast cancer treated with Herceptin  -Echo reviewed showing normal left ventricular systolic function and normal strain    2.  Chest pain  -EKG normal in office today  -Start aspirin 81 mg  -Evaluate for obstructive CAD with a stress myocardial perfusion study    3.  Hypertension  -She is on losartan, BP controlled  -Check lipids when she comes for stress test    Follow-up 4 months          Julián FRANK" MD Alecia FACC

## 2022-04-20 ENCOUNTER — HOSPITAL ENCOUNTER (OUTPATIENT)
Dept: CARDIOLOGY | Facility: HOSPITAL | Age: 52
Discharge: HOME | End: 2022-04-20
Admitting: NURSE PRACTITIONER

## 2022-04-20 ENCOUNTER — OFFICE VISIT (OUTPATIENT)
Dept: CARDIOLOGY | Facility: CLINIC | Age: 52
End: 2022-04-20

## 2022-04-20 VITALS
BODY MASS INDEX: 24.1 KG/M2 | OXYGEN SATURATION: 94 % | HEIGHT: 63 IN | WEIGHT: 136 LBS | RESPIRATION RATE: 16 BRPM | DIASTOLIC BLOOD PRESSURE: 74 MMHG | HEART RATE: 88 BPM | SYSTOLIC BLOOD PRESSURE: 120 MMHG

## 2022-04-20 VITALS — BODY MASS INDEX: 23.83 KG/M2 | WEIGHT: 134.48 LBS | HEIGHT: 63 IN

## 2022-04-20 DIAGNOSIS — R06.00 DYSPNEA, UNSPECIFIED TYPE: ICD-10-CM

## 2022-04-20 DIAGNOSIS — C50.412 MALIGNANT NEOPLASM OF UPPER-OUTER QUADRANT OF LEFT FEMALE BREAST, UNSPECIFIED ESTROGEN RECEPTOR STATUS: ICD-10-CM

## 2022-04-20 DIAGNOSIS — R07.9 CHEST PAIN, UNSPECIFIED TYPE: ICD-10-CM

## 2022-04-20 DIAGNOSIS — R07.9 CHEST PAIN, UNSPECIFIED TYPE: Primary | ICD-10-CM

## 2022-04-20 LAB
ASCENDING AORTA: 2.8 CM
BH CV ECHO LEFT VENTRICLE GLOBAL LONGITUDINAL STRAIN: -21.9 %
BH CV ECHO MEAS - AO MAX PG (FULL): 3 MMHG
BH CV ECHO MEAS - AO MAX PG: 9 MMHG
BH CV ECHO MEAS - AO MEAN PG (FULL): 1 MMHG
BH CV ECHO MEAS - AO MEAN PG: 4 MMHG
BH CV ECHO MEAS - AO ROOT AREA (BSA CORRECTED): 1.6
BH CV ECHO MEAS - AO ROOT AREA: 5.3 CM^2
BH CV ECHO MEAS - AO ROOT DIAM: 2.6 CM
BH CV ECHO MEAS - AO V2 MAX: 148 CM/SEC
BH CV ECHO MEAS - AO V2 MEAN: 95.8 CM/SEC
BH CV ECHO MEAS - AO V2 VTI: 26.8 CM
BH CV ECHO MEAS - ASC AORTA: 2.8 CM
BH CV ECHO MEAS - AVA(I,A): 2 CM^2
BH CV ECHO MEAS - AVA(I,D): 2 CM^2
BH CV ECHO MEAS - AVA(V,A): 2.1 CM^2
BH CV ECHO MEAS - AVA(V,D): 2.1 CM^2
BH CV ECHO MEAS - BSA(HAYCOCK): 1.6 M^2
BH CV ECHO MEAS - BSA: 1.6 M^2
BH CV ECHO MEAS - BZI_BMI: 24.5 KILOGRAMS/M^2
BH CV ECHO MEAS - BZI_METRIC_HEIGHT: 157.5 CM
BH CV ECHO MEAS - BZI_METRIC_WEIGHT: 60.8 KG
BH CV ECHO MEAS - EDV(CUBED): 56.6 ML
BH CV ECHO MEAS - EDV(MOD-SP2): 43 ML
BH CV ECHO MEAS - EDV(MOD-SP4): 57 ML
BH CV ECHO MEAS - EDV(TEICH): 63.5 ML
BH CV ECHO MEAS - EF(CUBED): 73.1 %
BH CV ECHO MEAS - EF(MOD-BP): 63 %
BH CV ECHO MEAS - EF(MOD-SP2): 55.8 %
BH CV ECHO MEAS - EF(MOD-SP4): 68.4 %
BH CV ECHO MEAS - EF(TEICH): 65.6 %
BH CV ECHO MEAS - ESV(CUBED): 15.3 ML
BH CV ECHO MEAS - ESV(MOD-SP2): 19 ML
BH CV ECHO MEAS - ESV(MOD-SP4): 18 ML
BH CV ECHO MEAS - ESV(TEICH): 21.9 ML
BH CV ECHO MEAS - FS: 35.4 %
BH CV ECHO MEAS - IVS/LVPW: 1.1
BH CV ECHO MEAS - IVSD: 1.1 CM
BH CV ECHO MEAS - LA DIMENSION: 3 CM
BH CV ECHO MEAS - LA/AO: 1.2
BH CV ECHO MEAS - LAD MAJOR: 4.5 CM
BH CV ECHO MEAS - LAT PEAK E' VEL: 10.7 CM/SEC
BH CV ECHO MEAS - LATERAL E/E' RATIO: 7.5
BH CV ECHO MEAS - LV DIASTOLIC VOL/BSA (35-75): 35.4 ML/M^2
BH CV ECHO MEAS - LV IVRT: 0.08 SEC
BH CV ECHO MEAS - LV MASS(C)D: 129.1 GRAMS
BH CV ECHO MEAS - LV MASS(C)DI: 80.1 GRAMS/M^2
BH CV ECHO MEAS - LV MAX PG: 6 MMHG
BH CV ECHO MEAS - LV MEAN PG: 3 MMHG
BH CV ECHO MEAS - LV SYSTOLIC VOL/BSA (12-30): 11.2 ML/M^2
BH CV ECHO MEAS - LV V1 MAX: 122 CM/SEC
BH CV ECHO MEAS - LV V1 MEAN: 73.1 CM/SEC
BH CV ECHO MEAS - LV V1 VTI: 21.3 CM
BH CV ECHO MEAS - LVIDD: 3.8 CM
BH CV ECHO MEAS - LVIDS: 2.5 CM
BH CV ECHO MEAS - LVLD AP2: 6.8 CM
BH CV ECHO MEAS - LVLD AP4: 7.4 CM
BH CV ECHO MEAS - LVLS AP2: 6.3 CM
BH CV ECHO MEAS - LVLS AP4: 6.2 CM
BH CV ECHO MEAS - LVOT AREA (M): 2.5 CM^2
BH CV ECHO MEAS - LVOT AREA: 2.5 CM^2
BH CV ECHO MEAS - LVOT DIAM: 1.8 CM
BH CV ECHO MEAS - LVPWD: 0.98 CM
BH CV ECHO MEAS - MED PEAK E' VEL: 10 CM/SEC
BH CV ECHO MEAS - MEDIAL E/E' RATIO: 8.1
BH CV ECHO MEAS - MV A MAX VEL: 87.9 CM/SEC
BH CV ECHO MEAS - MV DEC SLOPE: 496 CM/SEC^2
BH CV ECHO MEAS - MV DEC TIME: 0.22 SEC
BH CV ECHO MEAS - MV E MAX VEL: 80.5 CM/SEC
BH CV ECHO MEAS - MV E/A: 0.92
BH CV ECHO MEAS - MV P1/2T MAX VEL: 106 CM/SEC
BH CV ECHO MEAS - MV P1/2T: 62.6 MSEC
BH CV ECHO MEAS - MVA P1/2T LCG: 2.1 CM^2
BH CV ECHO MEAS - MVA(P1/2T): 3.5 CM^2
BH CV ECHO MEAS - PA ACC SLOPE: 671.5 CM/SEC^2
BH CV ECHO MEAS - PA ACC TIME: 0.12 SEC
BH CV ECHO MEAS - PA PR(ACCEL): 26.8 MMHG
BH CV ECHO MEAS - RAP SYSTOLE: 3 MMHG
BH CV ECHO MEAS - RVSP: 21 MMHG
BH CV ECHO MEAS - SI(AO): 88.2 ML/M^2
BH CV ECHO MEAS - SI(CUBED): 25.7 ML/M^2
BH CV ECHO MEAS - SI(LVOT): 33.6 ML/M^2
BH CV ECHO MEAS - SI(MOD-SP2): 14.9 ML/M^2
BH CV ECHO MEAS - SI(MOD-SP4): 24.2 ML/M^2
BH CV ECHO MEAS - SI(TEICH): 25.8 ML/M^2
BH CV ECHO MEAS - SV(AO): 142.3 ML
BH CV ECHO MEAS - SV(CUBED): 41.4 ML
BH CV ECHO MEAS - SV(LVOT): 54.2 ML
BH CV ECHO MEAS - SV(MOD-SP2): 24 ML
BH CV ECHO MEAS - SV(MOD-SP4): 39 ML
BH CV ECHO MEAS - SV(TEICH): 41.6 ML
BH CV ECHO MEAS - TAPSE (>1.6): 2.8 CM
BH CV ECHO MEAS - TR MAX PG: 18 MMHG
BH CV ECHO MEAS - TR MAX VEL: 210 CM/SEC
BH CV ECHO MEASUREMENTS AVERAGE E/E' RATIO: 7.78
BH CV VAS BP RIGHT ARM: NORMAL MMHG
BH CV XLRA - RV BASE: 2.7 CM
BH CV XLRA - RV LENGTH: 5.2 CM
BH CV XLRA - RV MID: 2 CM
BH CV XLRA - TDI S': 12.5 CM/SEC
IVRT: 82 MSEC
LEFT ATRIUM VOLUME INDEX: 27.9 ML/M^2
LEFT ATRIUM VOLUME: 45 ML
MAXIMAL PREDICTED HEART RATE: 168 BPM
STRESS TARGET HR: 143 BPM

## 2022-04-20 PROCEDURE — 99244 OFF/OP CNSLTJ NEW/EST MOD 40: CPT | Performed by: INTERNAL MEDICINE

## 2022-04-20 PROCEDURE — 93306 TTE W/DOPPLER COMPLETE: CPT

## 2022-04-20 PROCEDURE — 93000 ELECTROCARDIOGRAM COMPLETE: CPT | Performed by: INTERNAL MEDICINE

## 2022-04-20 PROCEDURE — 93306 TTE W/DOPPLER COMPLETE: CPT | Performed by: INTERNAL MEDICINE

## 2022-05-26 ENCOUNTER — HOSPITAL ENCOUNTER (OUTPATIENT)
Dept: CARDIOLOGY | Facility: HOSPITAL | Age: 52
End: 2022-05-26

## 2022-06-03 ENCOUNTER — TRANSCRIBE ORDERS (OUTPATIENT)
Dept: ADMINISTRATIVE | Facility: HOSPITAL | Age: 52
End: 2022-06-03

## 2022-06-03 DIAGNOSIS — Z12.31 VISIT FOR SCREENING MAMMOGRAM: Primary | ICD-10-CM

## 2022-07-18 ENCOUNTER — OFFICE VISIT (OUTPATIENT)
Dept: PSYCHIATRY | Facility: CLINIC | Age: 52
End: 2022-07-18

## 2022-07-18 DIAGNOSIS — N95.1 MENOPAUSAL SYMPTOMS: ICD-10-CM

## 2022-07-18 DIAGNOSIS — G47.9 SLEEP DISTURBANCE: ICD-10-CM

## 2022-07-18 DIAGNOSIS — F41.1 GENERALIZED ANXIETY DISORDER: Primary | ICD-10-CM

## 2022-07-18 PROCEDURE — 90836 PSYTX W PT W E/M 45 MIN: CPT | Performed by: NURSE PRACTITIONER

## 2022-07-18 PROCEDURE — 99212 OFFICE O/P EST SF 10 MIN: CPT | Performed by: NURSE PRACTITIONER

## 2022-07-18 NOTE — PROGRESS NOTES
"  Subjective   Katia Hatfield is a 51 y.o. female who is here today for medication management follow up. and therapy in person face to face with  and with Covid precautions taken, face mask, distance. 5 years since breast cancer treatment. Remains on Tamoxifen.     TIME IN:0856  TIME OUT: 0955    Chief Complaint: LIZA, sleep disturbance     History of Present Illness Patient presents with her  who reports she did well after last session in April with decreased anxiety and sleeping better. She didn't begin the venlafaxine XR 37.5 mg until June when she began having a lot more anxiety regarding work. She states she took only one capsule and had side effects with feeling like her arms and legs hurt and were swollen, racing heart, no breathing problems and couldn't sleep that night. She didn't try anymore. The patient reports the following symptoms of generalized anxiety: constant anxiety/worry, restlessness/on edge, difficulty concentrating, mind goes blank, irritability and muscle tension. The symptoms have been related to work and causes her also to lose appetite and can't turn her mind off from concerns about her work load. \"I feel very stressed\". 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 She thinks about quitting and working for [employer] so she doesn't have as much stress. Her  works full time and is an  and she reports he is supportive of what she wants. She reports not taking the zolpidem or trazodone anymore and is sleeping better now. Her  is no longer working on Saturdays. She works noon to 9pm because of [place] ([place] She would like to work same hours as her  and have more \"normal life\". Reports menopausal symptoms lessening.       Therapy:  Start Time: 0915    Stop Time:1000     (45 ) minutes was spent for psychotherapy. Assisted patient in processing patient's LIZA. Acknowledged and " normalized patient's thoughts, feelings, and concerns. Utilized cognitive behavioral therapy to assist the patient in recognizing more appropriate coping mechanisms when she becomes anxious/overwhelmed which are proven effective in reducing the severity of frequency of symptoms.     CLINICAL MANUEVERING/INTERVENTION:   Patient talked about current stressors, primarily having a difficult time dealing with her work/job. Venting of frustrations was conducted. Feelings were processed and validated, both negative and positive. Flushing out worries and concerns about changing jobs was conducted in order to diminish emotional tension. Processing what a new work life would look like was conducted. Ways in which patient may take stress off herself in a purposeful manner was discussed ie being with coworkers vs home alone working, taking a eleanor class at the [facility]. Patient was assisted in 'talking out' what she may do if work continues to be a challenge, keeping in mind the notion that there is typically a solution to any given problem. Utilized motivational interviewing techniques including complex reflections to attempt to assist the patient and focusing on the positive and to maintain and encourage calm outlook. Reports her  is very supportive of her decisions.  The patient expressed gratitude for today's session and said that counseling helps her feel better.      The following portions of the patient's history were reviewed and updated as appropriate: allergies, current medications, past family history, past medical history, past social history, past surgical history and problem list.    Review of Systems  A 14 point review of systems was performed and is negative except as noted above.    Objective   Physical Exam  There were no vitals taken for this visit.    Allergies   Allergen Reactions   • Iodine Rash     With itching       Current Medications:   Current Outpatient Medications   Medication Sig Dispense Refill    • CALCIUM-MAGNESIUM-VITAMIN D PO Take  by mouth.     • fluticasone (FLONASE) 50 MCG/ACT nasal spray fluticasone propionate 50 mcg/actuation nasal spray,suspension   Spray 1 spray every day by intranasal route as directed.     • losartan (COZAAR) 50 MG tablet losartan 50 mg tablet   Take 1 tablet every day by oral route.     • Multiple Vitamins-Minerals (ZINC PO) Take  by mouth.     • Omega-3 Fatty Acids (fish oil) 1000 MG capsule capsule Take  by mouth Daily With Breakfast.     • omeprazole (priLOSEC) 40 MG capsule Take 1 capsule by mouth Daily. 30 capsule 5   • Probiotic Product (PROBIOTIC DAILY PO) Take  by mouth.     • tamoxifen (NOLVADEX) 20 MG chemo tablet Take 1 tablet by mouth Daily. 90 tablet 3   • tamoxifen (NOLVADEX) 20 MG chemo tablet tamoxifen 20 mg tablet   Take 1 tablet every day by oral route.     • Thiamine HCl (VITAMIN B-1 PO) Take  by mouth.     • traZODone (DESYREL) 50 MG tablet Take 50 mg by mouth Every Night.     • zolpidem (AMBIEN) 10 MG tablet Take 1 tablet by mouth At Night As Needed for Sleep. 30 tablet 2     No current facility-administered medications for this visit.         LIZA-7:    Over the last two weeks, how often have you been bothered by the following problems?  Feeling nervous, anxious or on edge: Nearly every day  Not being able to stop or control worrying: Nearly every day  Worrying too much about different things: Nearly every day  Trouble Relaxing: More than half the days  Being so restless that it is hard to sit still: Not at all  Becoming easily annoyed or irritable: Not at all  Feeling afraid as if something awful might happen: Not at all  LIZA 7 Total Score: 11  If you checked any problems, how difficult have these problems made it for you to do your work, take care of things at home, or get along with other people: Very difficult  0-4: Minimal anxiety  5-9: Mild anxiety  10-14: Moderate anxiety  15-21: Severe anxiety      Appearance: WNL  Hygiene:  good  Cooperation:   Cooperative  Eye Contact:  direct  Psychomotor Behavior:  denies psychomotor agitation/retardation, No EPS, No motor tics  Mood: anxious   Affect:  Congruent   Hopelessness: Denies  Speech:  Normal  Thought Process:  Linear  Thought Content:  Normal  Concentration: Normal   Suicidal: denies  Homicidal:  None  Hallucinations:  None  Delusion:  None  Memory:  Intact  Orientation:  Person, Place, Time and Situation  Reliability:  good  Insight:  Fair  Judgement: good  Impulse Control: good  Estimated Intelligence: average range    JH REVIEWED NO RED FLAGS    Assessment & Plan   Diagnoses and all orders for this visit:    1. Generalized anxiety disorder (Primary)    2. Menopausal symptoms    3. Sleep disturbance      IMPRESSION: work distress increases anxiety     PLAN: DC venlafaxine XR from her medication list not taking was intolerant    Discussed in session job changes and designing her life  Relaxation techniques reviewed    Counseled patient regarding multimodal approach with encouragement of healthy nutrition, healthy sleep, regular physical mobility, social involvement, counseling, and medication compliance.     Assisted patient in identifying risk factors which would indicate the need for higher level of care including thoughts to harm self or others and/or self-harming behavior and encouraged patient to contact this office, call 911, or present to the nearest emergency room should any of these events occur. Discussed crisis intervention services and means to access.  Patient adamantly and convincingly denies current suicidal or homicidal ideation or perceptual disturbance.    Treatment Plan: stabilize mood, patient will stay out of psychiatric hospital and be at optimal level of functioning with therapy and take all medication as prescribed. Patient verbalized  understanding and agreement to plan.    Instructed to call for questions or concerns and return early if necessary.     Greater than 50% time was  spent in coordination of care, and counseling the patient regarding current assessment, symptoms, plan of care going forward, supportive therapy.  Answered any questions patient had regarding medications and plan of care.    Return in about 4 weeks (around 8/15/2022). for therapy

## 2022-08-29 ENCOUNTER — OFFICE VISIT (OUTPATIENT)
Dept: PSYCHIATRY | Facility: CLINIC | Age: 52
End: 2022-08-29

## 2022-08-29 DIAGNOSIS — G47.9 SLEEP DISTURBANCE: ICD-10-CM

## 2022-08-29 DIAGNOSIS — N95.1 MENOPAUSAL SYMPTOMS: ICD-10-CM

## 2022-08-29 DIAGNOSIS — F41.1 GENERALIZED ANXIETY DISORDER: Primary | ICD-10-CM

## 2022-08-29 PROCEDURE — 99212 OFFICE O/P EST SF 10 MIN: CPT | Performed by: NURSE PRACTITIONER

## 2022-08-29 NOTE — PROGRESS NOTES
"  Subjective   Katia Hatfield is a 51 y.o. female who is here today for medication management follow up. in person face to face with Covid precautions taken and with interrupter on IPAD pt agreeable to this.     TIME IN:1000  TIME OUT:1023    I spent 20  minutes in patient care: reviewing records prior to the visit, assessing the patient, entering orders and documentation.    Chief Complaint: LIZA, menopausal symptoms, sleep disturbance     History of Present Illness Patient reports she doesn't need to take sleep \"medication\" anymore able to fall asleep and stay asleep most nights. Getting average sleep 7 - 8 hours. Denies nightmares. She reports exercising with walking and takes more work breaks. Rates anxiety low regarding work. She reports setting better boundaries on her work amount and her doesn't feel so micro managed. She and  are doing well and he takes her walking or hiking in nature which she enjoys. She has decided to keep her current job and not work at [employer] \"I like working from home and feel much better about it\". She states she likes walking and seems to have helped. Reports cooking on the weekends enjoyable and took food to her friends and they really liked it. Denies other concerns.  Denies adverse effects from medications.   (Scales based on 0 - 10 with 10 being the worst)      The following portions of the patient's history were reviewed and updated as appropriate: allergies, current medications, past family history, past medical history, past social history, past surgical history and problem list.    Review of Systems  A 14 point review of systems was performed and is negative except as noted above.    Objective   Physical Exam  There were no vitals taken for this visit.    Allergies   Allergen Reactions   • Iodine Rash     With itching       Current Medications:   Current Outpatient Medications   Medication Sig Dispense Refill   • CALCIUM-MAGNESIUM-VITAMIN D PO Take  by mouth.     • " fluticasone (FLONASE) 50 MCG/ACT nasal spray fluticasone propionate 50 mcg/actuation nasal spray,suspension   Spray 1 spray every day by intranasal route as directed.     • losartan (COZAAR) 50 MG tablet losartan 50 mg tablet   Take 1 tablet every day by oral route.     • Multiple Vitamins-Minerals (ZINC PO) Take  by mouth.     • Omega-3 Fatty Acids (fish oil) 1000 MG capsule capsule Take  by mouth Daily With Breakfast.     • omeprazole (priLOSEC) 40 MG capsule Take 1 capsule by mouth Daily. 30 capsule 5   • Probiotic Product (PROBIOTIC DAILY PO) Take  by mouth.     • tamoxifen (NOLVADEX) 20 MG chemo tablet Take 1 tablet by mouth Daily. 90 tablet 3   • tamoxifen (NOLVADEX) 20 MG chemo tablet tamoxifen 20 mg tablet   Take 1 tablet every day by oral route.     • Thiamine HCl (VITAMIN B-1 PO) Take  by mouth.     • zolpidem (AMBIEN) 10 MG tablet Take 1 tablet by mouth At Night As Needed for Sleep. 30 tablet 2     No current facility-administered medications for this visit.       Lab Results: reviewed in chart      LIZA-7:    Over the last two weeks, how often have you been bothered by the following problems?  Feeling nervous, anxious or on edge: Several days  Not being able to stop or control worrying: Several days  Worrying too much about different things: Not at all  Trouble Relaxing: Not at all  Being so restless that it is hard to sit still: Not at all  Becoming easily annoyed or irritable: Several days  Feeling afraid as if something awful might happen: Not at all  LIZA 7 Total Score: 3  If you checked any problems, how difficult have these problems made it for you to do your work, take care of things at home, or get along with other people: Not difficult at all  0-4: Minimal anxiety  5-9: Mild anxiety  10-14: Moderate anxiety  15-21: Severe anxiety    Appearance: appropriate for season and age  Hygiene:   good  Cooperation:  Cooperative  Eye Contact:  Direct to fair   Psychomotor Behavior:  denies psychomotor  agitation/retardation, No EPS, No motor tics  Mood:  within normal limits  Affect:  Congruent   Hopelessness: Denies  Speech:  Normal  Thought Process:  Linear  Thought Content:  Normal  Concentration: Normal   Suicidal: denies  Homicidal:  None  Hallucinations:  None  Delusion:  None  Memory:  Intact  Orientation:  Person, Place, Time and Situation  Reliability:  good  Insight:  Fair  Judgement: good  Impulse Control: good  Estimated Intelligence: average range    JH REVIEWED NO RED FLAGS    Assessment & Plan   Diagnoses and all orders for this visit:    1. Generalized anxiety disorder (Primary)    2. Menopausal symptoms    3. Sleep disturbance          IMPRESSION: sleep improved, menopausal symptoms deminished, anxiety down within normal limits    PLAN: dc trazodone  Will see pt prn     Counseled patient regarding multimodal approach with encouragement of healthy nutrition, healthy sleep, regular physical mobility, social involvement, counseling, and medication compliance.     Assisted patient in identifying risk factors which would indicate the need for higher level of care including thoughts to harm self or others and/or self-harming behavior and encouraged patient to contact this office, call 911, or present to the nearest emergency room should any of these events occur. Discussed crisis intervention services and means to access.  Patient adamantly and convincingly denies current suicidal or homicidal ideation or perceptual disturbance.    Treatment Plan: stabilize mood, patient will stay out of psychiatric hospital and be at optimal level of functioning with therapy and take all medication as prescribed. Patient verbalized  understanding and agreement to plan.    Instructed to call for questions or concerns and return early if necessary.     Greater than 50% time was spent in coordination of care, and counseling the patient regarding current assessment, symptoms, plan of care going forward, supportive  therapy.  Answered any questions patient had regarding medications and plan of care.    No follow-ups on file.

## 2022-12-14 ENCOUNTER — HOSPITAL ENCOUNTER (OUTPATIENT)
Dept: MAMMOGRAPHY | Facility: HOSPITAL | Age: 52
Discharge: HOME | End: 2022-12-14
Admitting: INTERNAL MEDICINE

## 2022-12-14 DIAGNOSIS — Z12.31 VISIT FOR SCREENING MAMMOGRAM: ICD-10-CM

## 2022-12-14 PROCEDURE — 77063 BREAST TOMOSYNTHESIS BI: CPT

## 2022-12-14 PROCEDURE — 77067 SCR MAMMO BI INCL CAD: CPT

## 2022-12-14 PROCEDURE — 77067 SCR MAMMO BI INCL CAD: CPT | Performed by: RADIOLOGY

## 2022-12-14 PROCEDURE — 77063 BREAST TOMOSYNTHESIS BI: CPT | Performed by: RADIOLOGY

## 2023-06-19 ENCOUNTER — TRANSCRIBE ORDERS (OUTPATIENT)
Dept: ADMINISTRATIVE | Facility: HOSPITAL | Age: 53
End: 2023-06-19
Payer: COMMERCIAL

## 2023-06-19 DIAGNOSIS — C50.912 MALIGNANT NEOPLASM OF LEFT FEMALE BREAST, UNSPECIFIED ESTROGEN RECEPTOR STATUS, UNSPECIFIED SITE OF BREAST: Primary | ICD-10-CM

## 2023-12-15 ENCOUNTER — HOSPITAL ENCOUNTER (OUTPATIENT)
Dept: MAMMOGRAPHY | Facility: HOSPITAL | Age: 53
Discharge: HOME | End: 2023-12-15
Admitting: INTERNAL MEDICINE
Payer: COMMERCIAL

## 2023-12-15 DIAGNOSIS — C50.912 MALIGNANT NEOPLASM OF LEFT FEMALE BREAST, UNSPECIFIED ESTROGEN RECEPTOR STATUS, UNSPECIFIED SITE OF BREAST: ICD-10-CM

## 2023-12-15 PROCEDURE — 77065 DX MAMMO INCL CAD UNI: CPT

## 2023-12-15 PROCEDURE — G0279 TOMOSYNTHESIS, MAMMO: HCPCS
